# Patient Record
Sex: MALE | Race: WHITE | NOT HISPANIC OR LATINO | Employment: OTHER | URBAN - METROPOLITAN AREA
[De-identification: names, ages, dates, MRNs, and addresses within clinical notes are randomized per-mention and may not be internally consistent; named-entity substitution may affect disease eponyms.]

---

## 2022-03-31 ENCOUNTER — TELEPHONE (OUTPATIENT)
Dept: ORTHOPEDICS | Facility: CLINIC | Age: 82
End: 2022-03-31
Payer: MEDICARE

## 2022-03-31 NOTE — TELEPHONE ENCOUNTER
----- Message from Lida Martini sent at 3/31/2022  2:00 PM CDT -----  Contact: Kurtis Hull would like a call back at [phone], Regards to if Dr. Branch treat patient for  Osteoarthritis of the thumb she stated that he wants to know if he does before scheduling.    Thanks  Td

## 2022-04-01 NOTE — TELEPHONE ENCOUNTER
10:09    4/1/22      Spoke with patient.  Informed him that Dr. Branch does treat this condition. Scheduled an appointment for 4/7/22 at 2:30.

## 2022-04-05 DIAGNOSIS — M18.0 OSTEOARTHRITIS OF THUMBS, BILATERAL: Primary | ICD-10-CM

## 2022-04-07 ENCOUNTER — OFFICE VISIT (OUTPATIENT)
Dept: ORTHOPEDICS | Facility: CLINIC | Age: 82
End: 2022-04-07
Payer: MEDICARE

## 2022-04-07 VITALS — BODY MASS INDEX: 19.25 KG/M2 | WEIGHT: 142.13 LBS | HEIGHT: 72 IN

## 2022-04-07 DIAGNOSIS — M65.311 TRIGGER THUMB OF RIGHT HAND: ICD-10-CM

## 2022-04-07 PROCEDURE — 99202 OFFICE O/P NEW SF 15 MIN: CPT | Mod: S$GLB,,, | Performed by: ORTHOPAEDIC SURGERY

## 2022-04-07 PROCEDURE — 99202 PR OFFICE/OUTPT VISIT, NEW, LEVL II, 15-29 MIN: ICD-10-PCS | Mod: S$GLB,,, | Performed by: ORTHOPAEDIC SURGERY

## 2022-04-07 RX ORDER — ALENDRONATE SODIUM 70 MG/1
70 TABLET ORAL
COMMUNITY

## 2022-04-07 RX ORDER — ALFUZOSIN HYDROCHLORIDE 10 MG/1
10 TABLET, EXTENDED RELEASE ORAL
COMMUNITY

## 2022-04-07 RX ORDER — MULTIVIT-MIN/IRON/FOLIC ACID/K 18-600-40
CAPSULE ORAL
COMMUNITY

## 2022-04-07 NOTE — PROGRESS NOTES
Subjective:      Patient ID: Kurtis Rutherford is a 81 y.o. male.    Chief Complaint: Pain of the Right Hand and Pain of the Left Hand    HPI 81-year-old man comes in with triggering of his right thumb.    Review of Systems   Constitutional: Negative for fever and weight loss.   Cardiovascular: Negative for chest pain and leg swelling.   Musculoskeletal: Positive for joint pain. Negative for arthritis, joint swelling, muscle weakness and stiffness.   Gastrointestinal: Negative for change in bowel habit.   Genitourinary: Negative for bladder incontinence and hematuria.   Neurological: Negative for focal weakness, numbness, paresthesias and sensory change.         Objective:      Active and passive range of motion of the right thumb is normal.  He has a tender nodule in the region of the A1 pulley.  He has marked triggering with range of motion.      Ortho/SPM Exam            Assessment:       Encounter Diagnosis   Name Primary?    Trigger thumb of right hand           Plan:       Kurtis was seen today for pain and pain.    Diagnoses and all orders for this visit:    Trigger thumb of right hand  -     Ambulatory referral/consult to Orthopedics  -     X-Ray Hand 2 View Right; Future      He is given literature on trigger thumb.  He is going to consider injection.  He will contact us if he has decides to proceed

## 2024-11-20 ENCOUNTER — TELEPHONE (OUTPATIENT)
Dept: PULMONOLOGY | Facility: CLINIC | Age: 84
End: 2024-11-20
Payer: MEDICARE

## 2024-11-20 DIAGNOSIS — R91.1 LUNG NODULE: Primary | ICD-10-CM

## 2024-11-20 NOTE — TELEPHONE ENCOUNTER
----- Message from Katie sent at 11/20/2024  1:37 PM CST -----  Contact: self  Kurtis Rutherford calling to let constance know he no longer wants to schedule CT scan.

## 2024-11-20 NOTE — TELEPHONE ENCOUNTER
Spoke with patient to set up CT ION Protocol, order placed and sent to . Patient later called this office and stated he has decided to go to [facility] to be seen by Dr. Winkler. Does not wish to pursue care with . States all his medical records are with [facility]. Assured him that was his choice/right. Verbalized Understanding